# Patient Record
(demographics unavailable — no encounter records)

---

## 2025-04-11 NOTE — ASSESSMENT
[Vaccines Reviewed] : Immunizations reviewed today. Please see immunization details in the vaccine log within the immunization flowsheet.  [FreeTextEntry1] : #HCM -pap smear UTD  -flu shot defers, covid UTD -f/u labs

## 2025-04-11 NOTE — HISTORY OF PRESENT ILLNESS
[FreeTextEntry1] : CPE [de-identified] : 30 y/o F PMH asthma presents for CPE. Pt feels well, no acute complaints. On compounded semaglutide. Does pilates 2x a week and walks for exercise.

## 2025-04-11 NOTE — HEALTH RISK ASSESSMENT
[Yes] : Yes [2 - 4 times a month (2 pts)] : 2-4 times a month (2 points) [1 or 2 (0 pts)] : 1 or 2 (0 points) [Never (0 pts)] : Never (0 points) [Employed] : employed [Significant Other] : lives with significant other [Sexually Active] : sexually active [Never] : Never [0-4] : 0-4 [de-identified] : fiancre [de-identified] :